# Patient Record
Sex: MALE | URBAN - METROPOLITAN AREA
[De-identification: names, ages, dates, MRNs, and addresses within clinical notes are randomized per-mention and may not be internally consistent; named-entity substitution may affect disease eponyms.]

---

## 2017-11-28 ENCOUNTER — INPATIENT (INPATIENT)
Facility: HOSPITAL | Age: 62
LOS: 2 days | Discharge: HOME | DRG: 100 | End: 2017-12-01
Attending: INTERNAL MEDICINE | Admitting: INTERNAL MEDICINE
Payer: MEDICARE

## 2017-11-28 VITALS
HEART RATE: 112 BPM | SYSTOLIC BLOOD PRESSURE: 210 MMHG | OXYGEN SATURATION: 98 % | DIASTOLIC BLOOD PRESSURE: 108 MMHG | HEIGHT: 73 IN | TEMPERATURE: 98 F | WEIGHT: 164.91 LBS | RESPIRATION RATE: 20 BRPM

## 2017-11-28 DIAGNOSIS — C71.9 MALIGNANT NEOPLASM OF BRAIN, UNSPECIFIED: ICD-10-CM

## 2017-11-28 DIAGNOSIS — Z98.890 OTHER SPECIFIED POSTPROCEDURAL STATES: Chronic | ICD-10-CM

## 2017-11-28 DIAGNOSIS — G40.901 EPILEPSY, UNSPECIFIED, NOT INTRACTABLE, WITH STATUS EPILEPTICUS: ICD-10-CM

## 2017-11-28 DIAGNOSIS — G93.41 METABOLIC ENCEPHALOPATHY: ICD-10-CM

## 2017-11-28 DIAGNOSIS — N17.9 ACUTE KIDNEY FAILURE, UNSPECIFIED: ICD-10-CM

## 2017-11-28 DIAGNOSIS — Z98.890 OTHER SPECIFIED POSTPROCEDURAL STATES: ICD-10-CM

## 2017-11-28 DIAGNOSIS — Z90.49 ACQUIRED ABSENCE OF OTHER SPECIFIED PARTS OF DIGESTIVE TRACT: Chronic | ICD-10-CM

## 2017-11-28 DIAGNOSIS — M23.309 OTHER MENISCUS DERANGEMENTS, UNSPECIFIED MENISCUS, UNSPECIFIED KNEE: Chronic | ICD-10-CM

## 2017-11-28 DIAGNOSIS — I16.9 HYPERTENSIVE CRISIS, UNSPECIFIED: ICD-10-CM

## 2017-11-28 LAB
ALBUMIN SERPL ELPH-MCNC: 4.1 G/DL — SIGNIFICANT CHANGE UP (ref 3.3–5.2)
ALP SERPL-CCNC: 83 U/L — SIGNIFICANT CHANGE UP (ref 40–120)
ALT FLD-CCNC: 13 U/L — SIGNIFICANT CHANGE UP
ANION GAP SERPL CALC-SCNC: 24 MMOL/L — HIGH (ref 5–17)
ANISOCYTOSIS BLD QL: SLIGHT — SIGNIFICANT CHANGE UP
APTT BLD: 44.3 SEC — HIGH (ref 27.5–37.4)
AST SERPL-CCNC: 23 U/L — SIGNIFICANT CHANGE UP
BASOPHILS NFR BLD AUTO: 1 % — SIGNIFICANT CHANGE UP (ref 0–2)
BILIRUB SERPL-MCNC: 0.3 MG/DL — LOW (ref 0.4–2)
BUN SERPL-MCNC: 24 MG/DL — HIGH (ref 8–20)
CALCIUM SERPL-MCNC: 9.3 MG/DL — SIGNIFICANT CHANGE UP (ref 8.6–10.2)
CHLORIDE SERPL-SCNC: 99 MMOL/L — SIGNIFICANT CHANGE UP (ref 98–107)
CO2 SERPL-SCNC: 14 MMOL/L — LOW (ref 22–29)
CREAT SERPL-MCNC: 1.56 MG/DL — HIGH (ref 0.5–1.3)
EOSINOPHIL NFR BLD AUTO: 1 % — SIGNIFICANT CHANGE UP (ref 0–6)
GLUCOSE SERPL-MCNC: 206 MG/DL — HIGH (ref 70–115)
HCT VFR BLD CALC: 38.9 % — LOW (ref 42–52)
HGB BLD-MCNC: 13.3 G/DL — LOW (ref 14–18)
HYPOCHROMIA BLD QL: SLIGHT — SIGNIFICANT CHANGE UP
INR BLD: 0.97 RATIO — SIGNIFICANT CHANGE UP (ref 0.88–1.16)
LYMPHOCYTES # BLD AUTO: 39 % — SIGNIFICANT CHANGE UP (ref 20–55)
MACROCYTES BLD QL: SLIGHT — SIGNIFICANT CHANGE UP
MAGNESIUM SERPL-MCNC: 2 MG/DL — SIGNIFICANT CHANGE UP (ref 1.6–2.6)
MCHC RBC-ENTMCNC: 29.6 PG — SIGNIFICANT CHANGE UP (ref 27–31)
MCHC RBC-ENTMCNC: 34.2 G/DL — SIGNIFICANT CHANGE UP (ref 32–36)
MCV RBC AUTO: 86.4 FL — SIGNIFICANT CHANGE UP (ref 80–94)
MICROCYTES BLD QL: SLIGHT — SIGNIFICANT CHANGE UP
MONOCYTES NFR BLD AUTO: 12 % — HIGH (ref 3–10)
NEUTROPHILS NFR BLD AUTO: 45 % — SIGNIFICANT CHANGE UP (ref 37–73)
OVALOCYTES BLD QL SMEAR: SLIGHT — SIGNIFICANT CHANGE UP
PLAT MORPH BLD: NORMAL — SIGNIFICANT CHANGE UP
PLATELET # BLD AUTO: 210 K/UL — SIGNIFICANT CHANGE UP (ref 150–400)
POIKILOCYTOSIS BLD QL AUTO: SLIGHT — SIGNIFICANT CHANGE UP
POTASSIUM SERPL-MCNC: 3.7 MMOL/L — SIGNIFICANT CHANGE UP (ref 3.5–5.3)
POTASSIUM SERPL-SCNC: 3.7 MMOL/L — SIGNIFICANT CHANGE UP (ref 3.5–5.3)
PROT SERPL-MCNC: 7.8 G/DL — SIGNIFICANT CHANGE UP (ref 6.6–8.7)
PROTHROM AB SERPL-ACNC: 10.7 SEC — SIGNIFICANT CHANGE UP (ref 9.8–12.7)
RBC # BLD: 4.5 M/UL — LOW (ref 4.6–6.2)
RBC # FLD: 12.9 % — SIGNIFICANT CHANGE UP (ref 11–15.6)
RBC BLD AUTO: ABNORMAL
SODIUM SERPL-SCNC: 137 MMOL/L — SIGNIFICANT CHANGE UP (ref 135–145)
TROPONIN T SERPL-MCNC: <0.01 NG/ML — SIGNIFICANT CHANGE UP (ref 0–0.06)
VARIANT LYMPHS # BLD: 2 % — SIGNIFICANT CHANGE UP (ref 0–6)
WBC # BLD: 11.8 K/UL — HIGH (ref 4.8–10.8)
WBC # FLD AUTO: 11.8 K/UL — HIGH (ref 4.8–10.8)

## 2017-11-28 PROCEDURE — 93010 ELECTROCARDIOGRAM REPORT: CPT

## 2017-11-28 PROCEDURE — 71010: CPT | Mod: 26

## 2017-11-28 PROCEDURE — 31500 INSERT EMERGENCY AIRWAY: CPT

## 2017-11-28 PROCEDURE — 70450 CT HEAD/BRAIN W/O DYE: CPT | Mod: 26

## 2017-11-28 PROCEDURE — 99291 CRITICAL CARE FIRST HOUR: CPT

## 2017-11-28 PROCEDURE — 95819 EEG AWAKE AND ASLEEP: CPT | Mod: 26

## 2017-11-28 PROCEDURE — 99291 CRITICAL CARE FIRST HOUR: CPT | Mod: 25

## 2017-11-28 RX ORDER — ETOMIDATE 2 MG/ML
20 INJECTION INTRAVENOUS ONCE
Qty: 0 | Refills: 0 | Status: COMPLETED | OUTPATIENT
Start: 2017-11-28 | End: 2017-11-28

## 2017-11-28 RX ORDER — FENTANYL CITRATE 50 UG/ML
25 INJECTION INTRAVENOUS
Qty: 0 | Refills: 0 | Status: DISCONTINUED | OUTPATIENT
Start: 2017-11-28 | End: 2017-11-30

## 2017-11-28 RX ORDER — LEVETIRACETAM 250 MG/1
1000 TABLET, FILM COATED ORAL ONCE
Qty: 0 | Refills: 0 | Status: COMPLETED | OUTPATIENT
Start: 2017-11-28 | End: 2017-11-28

## 2017-11-28 RX ORDER — LEVETIRACETAM 250 MG/1
500 TABLET, FILM COATED ORAL EVERY 12 HOURS
Qty: 0 | Refills: 0 | Status: DISCONTINUED | OUTPATIENT
Start: 2017-11-28 | End: 2017-11-30

## 2017-11-28 RX ORDER — CHLORHEXIDINE GLUCONATE 213 G/1000ML
15 SOLUTION TOPICAL
Qty: 0 | Refills: 0 | Status: DISCONTINUED | OUTPATIENT
Start: 2017-11-28 | End: 2017-11-30

## 2017-11-28 RX ORDER — ETOMIDATE 2 MG/ML
10 INJECTION INTRAVENOUS ONCE
Qty: 0 | Refills: 0 | Status: COMPLETED | OUTPATIENT
Start: 2017-11-28 | End: 2017-11-28

## 2017-11-28 RX ORDER — FENTANYL CITRATE 50 UG/ML
50 INJECTION INTRAVENOUS ONCE
Qty: 0 | Refills: 0 | Status: DISCONTINUED | OUTPATIENT
Start: 2017-11-28 | End: 2017-11-28

## 2017-11-28 RX ORDER — PROPOFOL 10 MG/ML
5 INJECTION, EMULSION INTRAVENOUS
Qty: 500 | Refills: 0 | Status: DISCONTINUED | OUTPATIENT
Start: 2017-11-28 | End: 2017-11-28

## 2017-11-28 RX ORDER — NICARDIPINE HYDROCHLORIDE 30 MG/1
5 CAPSULE, EXTENDED RELEASE ORAL
Qty: 40 | Refills: 0 | Status: DISCONTINUED | OUTPATIENT
Start: 2017-11-28 | End: 2017-11-28

## 2017-11-28 RX ORDER — HALOPERIDOL DECANOATE 100 MG/ML
5 INJECTION INTRAMUSCULAR ONCE
Qty: 0 | Refills: 0 | Status: COMPLETED | OUTPATIENT
Start: 2017-11-28 | End: 2017-11-28

## 2017-11-28 RX ORDER — PROPOFOL 10 MG/ML
5 INJECTION, EMULSION INTRAVENOUS
Qty: 1000 | Refills: 0 | Status: DISCONTINUED | OUTPATIENT
Start: 2017-11-28 | End: 2017-11-29

## 2017-11-28 RX ORDER — PROPOFOL 10 MG/ML
40 INJECTION, EMULSION INTRAVENOUS ONCE
Qty: 0 | Refills: 0 | Status: COMPLETED | OUTPATIENT
Start: 2017-11-28 | End: 2017-11-28

## 2017-11-28 RX ORDER — NICARDIPINE HYDROCHLORIDE 30 MG/1
5 CAPSULE, EXTENDED RELEASE ORAL
Qty: 40 | Refills: 0 | Status: DISCONTINUED | OUTPATIENT
Start: 2017-11-28 | End: 2017-11-29

## 2017-11-28 RX ORDER — PANTOPRAZOLE SODIUM 20 MG/1
40 TABLET, DELAYED RELEASE ORAL DAILY
Qty: 0 | Refills: 0 | Status: DISCONTINUED | OUTPATIENT
Start: 2017-11-28 | End: 2017-11-30

## 2017-11-28 RX ORDER — DEXMEDETOMIDINE HYDROCHLORIDE IN 0.9% SODIUM CHLORIDE 4 UG/ML
0.5 INJECTION INTRAVENOUS
Qty: 200 | Refills: 0 | Status: DISCONTINUED | OUTPATIENT
Start: 2017-11-28 | End: 2017-11-29

## 2017-11-28 RX ADMIN — FENTANYL CITRATE 25 MICROGRAM(S): 50 INJECTION INTRAVENOUS at 16:37

## 2017-11-28 RX ADMIN — PROPOFOL 2.24 MICROGRAM(S)/KG/MIN: 10 INJECTION, EMULSION INTRAVENOUS at 07:21

## 2017-11-28 RX ADMIN — NICARDIPINE HYDROCHLORIDE 25 MG/HR: 30 CAPSULE, EXTENDED RELEASE ORAL at 10:14

## 2017-11-28 RX ADMIN — FENTANYL CITRATE 50 MICROGRAM(S): 50 INJECTION INTRAVENOUS at 18:55

## 2017-11-28 RX ADMIN — FENTANYL CITRATE 25 MICROGRAM(S): 50 INJECTION INTRAVENOUS at 01:00

## 2017-11-28 RX ADMIN — DEXMEDETOMIDINE HYDROCHLORIDE IN 0.9% SODIUM CHLORIDE 9.35 MICROGRAM(S)/KG/HR: 4 INJECTION INTRAVENOUS at 16:45

## 2017-11-28 RX ADMIN — HALOPERIDOL DECANOATE 5 MILLIGRAM(S): 100 INJECTION INTRAMUSCULAR at 06:58

## 2017-11-28 RX ADMIN — PROPOFOL 2.24 MICROGRAM(S)/KG/MIN: 10 INJECTION, EMULSION INTRAVENOUS at 10:15

## 2017-11-28 RX ADMIN — ETOMIDATE 20 MILLIGRAM(S): 2 INJECTION INTRAVENOUS at 07:09

## 2017-11-28 RX ADMIN — PROPOFOL 40 MILLIGRAM(S): 10 INJECTION, EMULSION INTRAVENOUS at 07:18

## 2017-11-28 RX ADMIN — PROPOFOL 40 MILLIGRAM(S): 10 INJECTION, EMULSION INTRAVENOUS at 07:12

## 2017-11-28 RX ADMIN — Medication 2 MILLIGRAM(S): at 06:40

## 2017-11-28 RX ADMIN — CHLORHEXIDINE GLUCONATE 15 MILLILITER(S): 213 SOLUTION TOPICAL at 17:38

## 2017-11-28 RX ADMIN — FENTANYL CITRATE 25 MICROGRAM(S): 50 INJECTION INTRAVENOUS at 09:45

## 2017-11-28 RX ADMIN — PANTOPRAZOLE SODIUM 40 MILLIGRAM(S): 20 TABLET, DELAYED RELEASE ORAL at 11:13

## 2017-11-28 RX ADMIN — DEXMEDETOMIDINE HYDROCHLORIDE IN 0.9% SODIUM CHLORIDE 9.35 MICROGRAM(S)/KG/HR: 4 INJECTION INTRAVENOUS at 23:37

## 2017-11-28 RX ADMIN — FENTANYL CITRATE 25 MICROGRAM(S): 50 INJECTION INTRAVENOUS at 16:07

## 2017-11-28 RX ADMIN — DEXMEDETOMIDINE HYDROCHLORIDE IN 0.9% SODIUM CHLORIDE 9.35 MICROGRAM(S)/KG/HR: 4 INJECTION INTRAVENOUS at 10:15

## 2017-11-28 RX ADMIN — FENTANYL CITRATE 50 MICROGRAM(S): 50 INJECTION INTRAVENOUS at 18:40

## 2017-11-28 RX ADMIN — DEXMEDETOMIDINE HYDROCHLORIDE IN 0.9% SODIUM CHLORIDE 9.35 MICROGRAM(S)/KG/HR: 4 INJECTION INTRAVENOUS at 12:00

## 2017-11-28 RX ADMIN — ETOMIDATE 10 MILLIGRAM(S): 2 INJECTION INTRAVENOUS at 07:09

## 2017-11-28 RX ADMIN — Medication 4 MILLIGRAM(S): at 05:45

## 2017-11-28 RX ADMIN — LEVETIRACETAM 400 MILLIGRAM(S): 250 TABLET, FILM COATED ORAL at 06:17

## 2017-11-28 RX ADMIN — Medication 2 MILLIGRAM(S): at 06:52

## 2017-11-28 RX ADMIN — ETOMIDATE 10 MILLIGRAM(S): 2 INJECTION INTRAVENOUS at 06:00

## 2017-11-28 RX ADMIN — LEVETIRACETAM 420 MILLIGRAM(S): 250 TABLET, FILM COATED ORAL at 17:38

## 2017-11-28 NOTE — ED ADULT NURSE REASSESSMENT NOTE - NS ED NURSE REASSESS COMMENT FT1
Pt is climbing out of bad and very confused and unable to follow commands. Wife at bedside. Dr. Blanchard aware and ativan administered. Pt still unable to stay still so an additional dose was given. Dr Blanchard @ bedside.

## 2017-11-28 NOTE — H&P ADULT - PMH
Astrocytoma brain tumor    CVA (cerebral vascular accident)    HTN (hypertension)    Libman-Sacks endocarditis    Lupus    TIA (transient ischemic attack)

## 2017-11-28 NOTE — H&P ADULT - HISTORY OF PRESENT ILLNESS
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  Pt has not seen a doctor or followed up in many years.  As per wife she heard pt scream and then found him on the floor in his room with what she describes as tonic/clonic seizure activity.  In ED pt had arrived post-ictal and had become very combative requiring multiple doses of Ativan and ultimately required intubation.  Pt currently intubated, s/p mult doses of Ativan and on Propofol gtt.  Unable to answer questions.  Pt's wife states he was in his usual state of health over last few days.  Normally high functioning but short term memory loss.

## 2017-11-28 NOTE — ED PROVIDER NOTE - CRITICAL CARE PROVIDED
additional history taking/consultation with other physicians/direct patient care (not related to procedure)/interpretation of diagnostic studies/documentation/consult w/ pt's family directly relating to pts condition

## 2017-11-28 NOTE — ED PROCEDURE NOTE - CPROC ED TRACHE INTUB DETAIL1
Patient was pre-oxygenated. An endotracheal tube (ETT) was placed through the vocal cords into the trachea. During intubation, staff applied gentle pressure to the cricoid cartilage. ETT position was confirmed by auscultation of bilateral breath sounds to all lung fields. ETCO2 level was appropriate./Patient connected to ventilator with settings as ordered./Difficult/crash intubation (see additional details section).

## 2017-11-28 NOTE — ED ADULT NURSE REASSESSMENT NOTE - NS ED NURSE REASSESS COMMENT FT1
assumed pt care this am while pt was just intubated, pt very combative. more iv' s started, diprivan infusion started. [place] placed per MD. Pt was also cleaned and changed due to large BM. Rectal temp also complete. await ICU bed.

## 2017-11-28 NOTE — H&P ADULT - MENTAL STATUS
sedated, intubated, received Ativan  Will have to re-evaluate neurologically once sedation wears off

## 2017-11-28 NOTE — CONSULT NOTE ADULT - SUBJECTIVE AND OBJECTIVE BOX
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  Pt has not seen a doctor or followed up in many years.  As per wife she heard pt scream and then found him on the floor in his room with what she describes as tonic/clonic seizure activity.  In ED pt had arrived post-ictal and had become very combative requiring multiple doses of Ativan and ultimately required intubation.  Pt currently intubated, s/p mult doses of Ativan and on Propofol gtt.  Unable to answer questions.  Pt's wife states he was in his usual state of health over last few days.  Normally high functioning but short term memory loss.    Patient seen this morning. He is intubated and agitated despite sedation as listed. No history of seizures.    PAST MEDICAL & SURGICAL HISTORY:  Libman-Sacks endocarditis  Lupus  CVA (cerebral vascular accident)  TIA (transient ischemic attack)  HTN (hypertension)  Astrocytoma brain tumor  Other meniscus derangements: repair  History of appendectomy  H/O brain surgery    MEDICATIONS  (STANDING):  chlorhexidine 0.12% Liquid 15 milliLiter(s) Swish and Spit two times a day  dexmedetomidine Infusion 0.5 MICROgram(s)/kG/Hr (9.35 mL/Hr) IV Continuous <Continuous>  levETIRAcetam  IVPB 500 milliGRAM(s) IV Intermittent every 12 hours  niCARdipine Infusion 5 mG/Hr (25 mL/Hr) IV Continuous <Continuous>  pantoprazole  Injectable 40 milliGRAM(s) IV Push daily  propofol Infusion 5 MICROgram(s)/kG/Min (2.244 mL/Hr) IV Continuous <Continuous>    MEDICATIONS  (PRN):  fentaNYL    Injectable 25 MICROGram(s) IV Push every 3 hours PRN Severe Pain (7 - 10)    Allergies    phenobarbital ([place]-Royal)    Intolerances        FAMILY HISTORY:  No pertinent family history in first degree relatives          SOCIAL HISTORY:    Tobacco:  no  Alcohol:  no  Drugs:  no        REVIEW OF SYSTEMS:    Relevant systems are negative except as noted in the chart, HPI, and PMH      VITAL SIGNS:  Vital Signs Last 24 Hrs  T(C): 37.4 (28 Nov 2017 10:15), Max: 37.4 (28 Nov 2017 09:50)  T(F): 99.3 (28 Nov 2017 10:15), Max: 99.3 (28 Nov 2017 09:50)  HR: 75 (28 Nov 2017 13:00) (73 - 124)  BP: 108/59 (28 Nov 2017 13:00) (108/59 - 218/121)  BP(mean): 78 (28 Nov 2017 13:00) (78 - 138)  RR: 16 (28 Nov 2017 13:00) (14 - 31)  SpO2: 100% (28 Nov 2017 13:00) (94% - 100%)    PHYSICAL EXAMINATION:    General: Well-developed, well nourished, He is intabated and agitated despite propofol and presidex  Cardiac:  Regular rate and rhythm. No carotid bruits appreciated.  Eyes: Fundoscopic examination was deferred.  Neurologic:  - Mental Status: as stated. Follows no commands  Cranial Nerves II-XII:    II:   ; Visual fields- blinks to threat; Pupils are equal, round, and reactive to light.  - Motor:  Strength- formal testing not possible. Moves all 4 limbs with no weakness    - Reflexes:  2+ and symmetric at the knees.  Plantar responses flexor.  l      LABS:                          13.3   11.8  )-----------( 210      ( 28 Nov 2017 06:03 )             38.9     28 Nov 2017 06:03    137    |  99     |  24.0   ----------------------------<  206    3.7     |  14.0   |  1.56     Ca    9.3        28 Nov 2017 06:03  Mg     2.0       28 Nov 2017 06:03    TPro  7.8    /  Alb  4.1    /  TBili  0.3    /  DBili  x      /  AST  23     /  ALT  13     /  AlkPhos  83     28 Nov 2017 06:03    LIVER FUNCTIONS - ( 28 Nov 2017 06:03 )  Alb: 4.1 g/dL / Pro: 7.8 g/dL / ALK PHOS: 83 U/L / ALT: 13 U/L / AST: 23 U/L / GGT: x           PT/INR - ( 28 Nov 2017 06:03 )   PT: 10.7 sec;   INR: 0.97 ratio         PTT - ( 28 Nov 2017 06:03 )  PTT:44.3 sec      RADIOLOGY & ADDITIONAL STUDIES:      < from: CT Head No Cont (11.28.17 @ 06:39) >  IMPRESSION: No intracranial hemorrhage or evidence for acute territorial   infarct.     Age indeterminant left thalamic infarct. Status post right pterional   craniotomy with subjacent encephalomalacia/gliosis. Asymmetric left   frontal white matter lucency may represent additional focus of gliosis.     < end of copied text >    IMPRESSION:  62 year old man with h/o astrocytoma resection more than 15 years ago presents with seizure.  No h/o seizures in past. Currently  he is intubated and agitated but otherwise his exam is nonfocal.  He apparently has not seen a neurologist or neurosurgeon in many years.    PLAN:  1. Critical care management - as discussed  2.  Continue keppra.  3.  cEEG monitoring  4. MRI brain with kayleen once stable.  5.

## 2017-11-28 NOTE — ED ADULT NURSE NOTE - OBJECTIVE STATEMENT
Assumed pt care @ 0545. Pt received sitting on stretcher in NAD. Pt AOx0 with wife @ bedside. Pt heard a yell and then a loud thump at around 0440.  Pt found on the floor seizing. Pt not speaking but combative and confused. Pt unable to sit still. Pt given etomidate to get CT scan of brain. Pt has a h/o brain tumor partially removed in 2001 and a embolic stroke in 2012. NSR on cardiac monitor. Lungs CTA, RR even unlabored.  Skin warm, dry, color appropriate for age and race.

## 2017-11-28 NOTE — H&P ADULT - ATTENDING COMMENTS
I saw this patient multiple times throughout the late morning and early afternoon and agree with the above.  EEG is currently being obtained -- the sister at bedside indicates that he is not redirectable, and has poor short term memory, so our ability to obtain good EEG waveforms will be predicated in part on managing his airway in the short run.  Will attempt to eliminate propofol infusion and escalate dexmedetomidine infusion as needed for agitation at this point -- anticipate ability to extubate tomorrow based on titration requirements of antiepileptics -- if no seizure activity, would aim for extubation early.    Updated sister at bedside.

## 2017-11-28 NOTE — ED ADULT NURSE REASSESSMENT NOTE - NS ED NURSE REASSESS COMMENT FT1
Pt extremely agitated and climbing out of meds despite chemically restraining pt. Dr. Blanchard to intubate pt for safety. Wife @ bedside and okay with the plan of care.  0709 20 of etomidate given. 40 of Propofol pushed @ 0712 with an additional 10 of etomidate @0715 and another 40 of propofol @ 0718. Pt tubed @ 0718 7.5 tube 23 @ the lip. 16F Og tube placed @ 0720. Propofol drip started @ 0721.

## 2017-11-28 NOTE — ED PROVIDER NOTE - MEDICAL DECISION MAKING DETAILS
Plan to do Head CT to r/o bleeding, labs including electrolytes and magnesium levels, will give Keppra, admit to medicine and consult [id number] fo revaluation.

## 2017-11-28 NOTE — H&P ADULT - PROBLEM SELECTOR PLAN 1
Not on AED since post surgery in 2001  Loaded with Keppra in ER  Continue Keppra  Neurology/[facility] group callled  STAT EEG  Admit to ICU

## 2017-11-28 NOTE — ED PROVIDER NOTE - PROGRESS NOTE DETAILS
pt still not remaining still, trying to get out of bed despite total of ativan 6mg and haldol 5mg.   decision made to intubate.  will admit to micu

## 2017-11-28 NOTE — ED PROVIDER NOTE - OBJECTIVE STATEMENT
61 y/o male with PMHx of hippocampal tumor s/p resection presents to the ED with c/o witnessed seizure, onset this morning. Pt wife reported generalized tonic clonic seizure that lasted a few seconds to a minute max. Pt was found by wife. Pt postictal, pt appeared to be in respiratory distress per EMS, pt was turned on side and found to be breathing normally. In the ED, pt was post-ictal and became combative, pt was able to move around in bed. Pt was given a total 4 mg of Ativan before being able to be control. Pt has had seizures in the past, not currently on medication, Shashank-Royal's reaction to Phenobarbital. Pt was in normal states of cabrera until this morning.

## 2017-11-28 NOTE — H&P ADULT - ASSESSMENT
62 YOM with status epilepticus, intubated due to failed airway protection/respiratory failure, AMS/encephalopathy, r/o tumor recurrance, r/o stroke, DEMARCUS

## 2017-11-28 NOTE — ED ADULT TRIAGE NOTE - CHIEF COMPLAINT QUOTE
Pt's wife states "I heard him scream and heard a thump and saw him face down on the floor seizing", pt postictal, no obvious signs of trauma

## 2017-11-28 NOTE — ED PROVIDER NOTE - PMH
Astrocytoma brain tumor    CVA (cerebral vascular accident)    HTN (hypertension)    Lupus    TIA (transient ischemic attack)

## 2017-11-28 NOTE — EEG REPORT - NS EEG TEXT BOX
[facility]  Report of Portable EEG with Video    [facility]: [address], [place] [address], Phone: [phone]  [facility]: [address] [place] [address], [place] [address], Phone: [phone]  Office: [address] [facility], [place] [address], [place] [address], Phone: [phone]    Patient Name: Bret Castro    Age: 62 y  : 1955  Patient ID: -, MRN #: -, Location: -  Referring Physician: Corinne M. Walsh    EEG #: [id number]  Study Date: 2017		    Technical Information:					  On Instrument: -  Placement and Labeling of Electrodes:  The EEG was performed utilizing 20 channels referential EEG connections (coronal over temporal over parasagittal montage) using all standard 10-20 electrode placements with EKG.  Recording was at a sampling rate of 256 samples per second per channel.  Time synchronized digital video recording was done simultaneously with EEG recording.  A low light infrared camera was used for low light recording.  Gilberto and seizure detection algorithms were utilized.    History:  Crani/resection right temporal astrocytoma in .  Brought in seizing-given 8 mg. Ativan and propofol to break.    -    Medication	  <Medication>Precedex	  <Medication>Keppra	  <Medication>Propofol	    Study Interpretation:    FINDINGS:  The background consists of diffuse alpha and theta range activity with abundant 15-25 Hz beta seen over the right fronto-temporal region. In addition to higher frequency activity there is also higher amplitude EEG over the right fronto-cental and temporal regions. Periods of generalized background slowing lasting several seconds occur in the latter part of the recording.    Background Slowing:  Moderate to marked generalized slowing..    Sleep Background:  No sleep architecture.    Epileptiform Activity:   Occasional right fronto-cental spike discharges are seen embedded in the burst activity.    Events:  No clinical events were recorded.  No seizures were recorded.    Activation Procedures  -Photic stimulation was performed and did not elicit any abnormalities.      EEG Impression:    This is an abnormal portable routine EEG due to the presence of:  1. Moderate to marked generalized slowing with periods of background suppression  2. Right hemisphere breach artifact  3. Right fronto-central epileptiform spike discharges  4. No clinical or electrographic seizures    Clinical Correlation:    The study is consistent with right hemisphere post-operative changes, generalized anesthetic effects and right frontal interictal epileptiform spike focus.        	  Blaine Mckinnon MD  Director, Neurology, [facility]  , [facility]

## 2017-11-29 DIAGNOSIS — F06.8 OTHER SPECIFIED MENTAL DISORDERS DUE TO KNOWN PHYSIOLOGICAL CONDITION: ICD-10-CM

## 2017-11-29 DIAGNOSIS — I10 ESSENTIAL (PRIMARY) HYPERTENSION: ICD-10-CM

## 2017-11-29 DIAGNOSIS — J96.00 ACUTE RESPIRATORY FAILURE, UNSPECIFIED WHETHER WITH HYPOXIA OR HYPERCAPNIA: ICD-10-CM

## 2017-11-29 LAB
ANION GAP SERPL CALC-SCNC: 13 MMOL/L — SIGNIFICANT CHANGE UP (ref 5–17)
BUN SERPL-MCNC: 23 MG/DL — HIGH (ref 8–20)
CALCIUM SERPL-MCNC: 9.1 MG/DL — SIGNIFICANT CHANGE UP (ref 8.6–10.2)
CHLORIDE SERPL-SCNC: 103 MMOL/L — SIGNIFICANT CHANGE UP (ref 98–107)
CO2 SERPL-SCNC: 23 MMOL/L — SIGNIFICANT CHANGE UP (ref 22–29)
CREAT SERPL-MCNC: 1.43 MG/DL — HIGH (ref 0.5–1.3)
GLUCOSE SERPL-MCNC: 132 MG/DL — HIGH (ref 70–115)
HCT VFR BLD CALC: 34.4 % — LOW (ref 42–52)
HGB BLD-MCNC: 11.7 G/DL — LOW (ref 14–18)
MAGNESIUM SERPL-MCNC: 2 MG/DL — SIGNIFICANT CHANGE UP (ref 1.6–2.6)
MCHC RBC-ENTMCNC: 28.7 PG — SIGNIFICANT CHANGE UP (ref 27–31)
MCHC RBC-ENTMCNC: 34 G/DL — SIGNIFICANT CHANGE UP (ref 32–36)
MCV RBC AUTO: 84.3 FL — SIGNIFICANT CHANGE UP (ref 80–94)
PHOSPHATE SERPL-MCNC: 2.7 MG/DL — SIGNIFICANT CHANGE UP (ref 2.4–4.7)
PLATELET # BLD AUTO: 154 K/UL — SIGNIFICANT CHANGE UP (ref 150–400)
POTASSIUM SERPL-MCNC: 3.9 MMOL/L — SIGNIFICANT CHANGE UP (ref 3.5–5.3)
POTASSIUM SERPL-SCNC: 3.9 MMOL/L — SIGNIFICANT CHANGE UP (ref 3.5–5.3)
RBC # BLD: 4.08 M/UL — LOW (ref 4.6–6.2)
RBC # FLD: 12.7 % — SIGNIFICANT CHANGE UP (ref 11–15.6)
SODIUM SERPL-SCNC: 139 MMOL/L — SIGNIFICANT CHANGE UP (ref 135–145)
WBC # BLD: 10.5 K/UL — SIGNIFICANT CHANGE UP (ref 4.8–10.8)
WBC # FLD AUTO: 10.5 K/UL — SIGNIFICANT CHANGE UP (ref 4.8–10.8)

## 2017-11-29 PROCEDURE — 99291 CRITICAL CARE FIRST HOUR: CPT

## 2017-11-29 PROCEDURE — 95951: CPT | Mod: 26

## 2017-11-29 PROCEDURE — 70552 MRI BRAIN STEM W/DYE: CPT | Mod: 26

## 2017-11-29 RX ORDER — FENTANYL CITRATE 50 UG/ML
50 INJECTION INTRAVENOUS ONCE
Qty: 0 | Refills: 0 | Status: DISCONTINUED | OUTPATIENT
Start: 2017-11-29 | End: 2017-11-30

## 2017-11-29 RX ORDER — SODIUM CHLORIDE 9 MG/ML
1000 INJECTION, SOLUTION INTRAVENOUS
Qty: 0 | Refills: 0 | Status: DISCONTINUED | OUTPATIENT
Start: 2017-11-29 | End: 2017-11-29

## 2017-11-29 RX ORDER — DEXMEDETOMIDINE HYDROCHLORIDE IN 0.9% SODIUM CHLORIDE 4 UG/ML
1.5 INJECTION INTRAVENOUS
Qty: 200 | Refills: 0 | Status: DISCONTINUED | OUTPATIENT
Start: 2017-11-29 | End: 2017-11-29

## 2017-11-29 RX ORDER — ENOXAPARIN SODIUM 100 MG/ML
40 INJECTION SUBCUTANEOUS DAILY
Qty: 0 | Refills: 0 | Status: DISCONTINUED | OUTPATIENT
Start: 2017-11-29 | End: 2017-12-01

## 2017-11-29 RX ADMIN — FENTANYL CITRATE 25 MICROGRAM(S): 50 INJECTION INTRAVENOUS at 03:52

## 2017-11-29 RX ADMIN — Medication 2 MILLIGRAM(S): at 14:09

## 2017-11-29 RX ADMIN — PANTOPRAZOLE SODIUM 40 MILLIGRAM(S): 20 TABLET, DELAYED RELEASE ORAL at 11:04

## 2017-11-29 RX ADMIN — LEVETIRACETAM 420 MILLIGRAM(S): 250 TABLET, FILM COATED ORAL at 05:04

## 2017-11-29 RX ADMIN — PROPOFOL 2.24 MICROGRAM(S)/KG/MIN: 10 INJECTION, EMULSION INTRAVENOUS at 04:52

## 2017-11-29 RX ADMIN — CHLORHEXIDINE GLUCONATE 15 MILLILITER(S): 213 SOLUTION TOPICAL at 17:25

## 2017-11-29 RX ADMIN — SODIUM CHLORIDE 100 MILLILITER(S): 9 INJECTION, SOLUTION INTRAVENOUS at 04:49

## 2017-11-29 RX ADMIN — LEVETIRACETAM 420 MILLIGRAM(S): 250 TABLET, FILM COATED ORAL at 17:25

## 2017-11-29 RX ADMIN — DEXMEDETOMIDINE HYDROCHLORIDE IN 0.9% SODIUM CHLORIDE 9.35 MICROGRAM(S)/KG/HR: 4 INJECTION INTRAVENOUS at 03:40

## 2017-11-29 RX ADMIN — ENOXAPARIN SODIUM 40 MILLIGRAM(S): 100 INJECTION SUBCUTANEOUS at 11:05

## 2017-11-29 RX ADMIN — FENTANYL CITRATE 25 MICROGRAM(S): 50 INJECTION INTRAVENOUS at 03:37

## 2017-11-29 RX ADMIN — CHLORHEXIDINE GLUCONATE 15 MILLILITER(S): 213 SOLUTION TOPICAL at 05:04

## 2017-11-29 RX ADMIN — Medication 2 MILLIGRAM(S): at 06:43

## 2017-11-29 NOTE — EEG REPORT - NS EEG TEXT BOX
[facility]  Report of Continuous Video EEG    [facility]: [address], [place] [address], Ph#: [phone]  [facility]: [address], [place] [address], Ph#: [phone]  Office: [address] [facility], [facility] [address], [place] [address] Ph#: [phone]    Patient Name: Bret Castro    Age: 62 y, : 1955  Patient ID: -, MRN #: -, Francis: -  Referring Physician: Corinne M. Walsh  EEG #: [id number]    Study Date: 2017    Study Information:    EEG Recording Technique:  The patient underwent continuous Video-EEG monitoring, using Telemetry System hardware on the XLTek Digital System. EEG and video data were stored on a computer hard drive with important events saved in digital archive files. The material was reviewed by a physician (electroencephalographer / epileptologist) on a daily basis. Gilberto and seizure detection algorithms were utilized and reviewed. An EEG Technician attended to the patient, and was available throughout daytime work hours.  The epilepsy center neurologist was available in person or on call 24-hours per day.  EEG Placement and Labeling of Electrodes:  The EEG was performed utilizing 20 channel referential EEG connections (coronal over temporal over parasagittal montage) using all standard 10-20 electrode placements with EKG, with additional electrodes placed in the inferior temporal region using the modified 10-10 montage electrode placements for elective admissions, or if deemed necessary. Recording was at a sampling rate of 256 samples per second per channel. Time synchronized digital video recording was done simultaneously with EEG recording. A low light infrared camera was used for low light recording.   History:  Crani/resection right temporal astrocytoma in .  Brought in seizing-given 8 mg. Ativan and propofol to break.    -    Medication	  <Medication>Precedex	  <Medication>Keppra	  <Medication>Propofol	    Study Interpretation:    FINDINGS:  At the start of the recordingthe  background consisted of diffuse alpha and theta range activity with abundant 15-25 Hz beta seen over the right fronto-temporal region. In addition to higher frequency activity there is also higher amplitude EEG over the right fronto-cental and temporal regions. Periods of generalized background slowing lasting several seconds occur in the latter part of the recording.    As propofol was tapered  background became continuous without  periods of suppression.  By the early AM of  patient was arouseable briefly with diffuse theta and some admixed alpha range activity during periods of responsiveness.    Background Slowing:  Moderate to marked generalized slowing improving to mild to moderate generalized slowing.    Sleep Background:  Vertex sharp waves seen asymmetrically, greater on right by end of study.    Epileptiform Activity:   Occasional low amplitude right fronto-cental sharp and spike discharges are seen..    Events:  No clinical events were recorded.  No seizures were recorded.    Activation Procedures  -Photic stimulation was performed and did not elicit any abnormalities.      EEG Impression:    This is an abnormal video EEG due to the presence of:  1. Moderate to marked generalized slowing with periods of background suppression improving to mild to moderate generalized slowing by the AM of .  2. Right hemisphere breach artifact and right  frontal slowing  3. Right fronto-central epileptiform sharp and rare spike discharges  4. No clinical or electrographic seizures    Clinical Correlation:    The study is consistent with right hemisphere post-operative changes, improving  encephalopathy and right frontal interictal epileptiform focus with risk for partial seizures.        	  Blaine Mckinnon MD  Director, Neurology, [facility]  , [facility]

## 2017-11-29 NOTE — PROGRESS NOTE ADULT - SUBJECTIVE AND OBJECTIVE BOX
Patient is a 62y old  Male who presents with a chief complaint of Seizures (28 Nov 2017 09:10)      BRIEF HOSPITAL COURSE: ***    Events last 24 hours: ***    PAST MEDICAL & SURGICAL HISTORY:  Libman-Sacks endocarditis  Lupus  CVA (cerebral vascular accident)  TIA (transient ischemic attack)  HTN (hypertension)  Astrocytoma brain tumor  Other meniscus derangements: repair  History of appendectomy  H/O brain surgery      Review of Systems:  CONSTITUTIONAL: No fever, chills, or fatigue  EYES: No eye pain, visual disturbances, or discharge  ENMT:  No difficulty hearing, tinnitus, vertigo; No sinus or throat pain  NECK: No pain or stiffness  RESPIRATORY: No cough, wheezing, chills or hemoptysis; No shortness of breath  CARDIOVASCULAR: No chest pain, palpitations, dizziness, or leg swelling  GASTROINTESTINAL: No abdominal or epigastric pain. No nausea, vomiting, or hematemesis; No diarrhea or constipation. No melena or hematochezia.  GENITOURINARY: No dysuria, frequency, hematuria, or incontinence  NEUROLOGICAL: No headaches, memory loss, loss of strength, numbness, or tremors  SKIN: No itching, burning, rashes, or lesions   MUSCULOSKELETAL: No joint pain or swelling; No muscle, back, or extremity pain  PSYCHIATRIC: No depression, anxiety, mood swings, or difficulty sleeping      Medications:        dexmedetomidine Infusion 0.5 MICROgram(s)/kG/Hr IV Continuous <Continuous>  fentaNYL    Injectable 25 MICROGram(s) IV Push every 3 hours PRN  fentaNYL    Injectable 50 MICROGram(s) IV Push once  levETIRAcetam  IVPB 500 milliGRAM(s) IV Intermittent every 12 hours  propofol Infusion 5 MICROgram(s)/kG/Min IV Continuous <Continuous>        pantoprazole  Injectable 40 milliGRAM(s) IV Push daily        multiple electrolytes Injection Type 1 1000 milliLiter(s) IV Continuous <Continuous>      chlorhexidine 0.12% Liquid 15 milliLiter(s) Swish and Spit two times a day        Mode: CPAP with PS  FiO2: 30  PEEP: 5  PS: 10  MAP: 13.4      ICU Vital Signs Last 24 Hrs  T(C): 36.8 (29 Nov 2017 04:00), Max: 37.4 (28 Nov 2017 09:50)  T(F): 98.2 (29 Nov 2017 04:00), Max: 99.3 (28 Nov 2017 09:50)  HR: 59 (29 Nov 2017 05:00) (54 - 124)  BP: 119/65 (29 Nov 2017 05:00) (101/58 - 218/121)  BP(mean): 87 (29 Nov 2017 05:00) (74 - 138)  ABP: --  ABP(mean): --  RR: 17 (29 Nov 2017 05:00) (14 - 31)  SpO2: 100% (29 Nov 2017 05:00) (94% - 100%)          I&O's Detail    28 Nov 2017 07:01  -  29 Nov 2017 05:21  --------------------------------------------------------  IN:    dexmedetomidine Infusion: 293.2 mL    multiple electrolytes Injection Type 1: 100 mL    niCARdipine Infusion: 50 mL    propofol Infusion: 28.8 mL    propofol Infusion: 14.2 mL  Total IN: 486.2 mL    OUT:    Indwelling Catheter - Urethral: 1475 mL    Nasoenteral Tube: 150 mL  Total OUT: 1625 mL    Total NET: -1138.8 mL            LABS:                        13.3   11.8  )-----------( 210      ( 28 Nov 2017 06:03 )             38.9     11-28    137  |  99  |  24.0<H>  ----------------------------<  206<H>  3.7   |  14.0<L>  |  1.56<H>    Ca    9.3      28 Nov 2017 06:03  Mg     2.0     11-28    TPro  7.8  /  Alb  4.1  /  TBili  0.3<L>  /  DBili  x   /  AST  23  /  ALT  13  /  AlkPhos  83  11-28      CARDIAC MARKERS ( 28 Nov 2017 06:03 )  x     / <0.01 ng/mL / x     / x     / x          CAPILLARY BLOOD GLUCOSE        PT/INR - ( 28 Nov 2017 06:03 )   PT: 10.7 sec;   INR: 0.97 ratio         PTT - ( 28 Nov 2017 06:03 )  PTT:44.3 sec    CULTURES:      Physical Examination:    General: No acute distress.  Alert, oriented, interactive, nonfocal    HEENT: Pupils equal, reactive to light.  Symmetric.    PULM: Clear to auscultation bilaterally, no significant sputum production    CVS: Regular rate and rhythm, no murmurs, rubs, or gallops    ABD: Soft, nondistended, nontender, normoactive bowel sounds, no masses    EXT: No edema, nontender    SKIN: Warm and well perfused, no rashes noted.    NEURO: A&Ox3, strength 5/5 all extremities, cranial nerves grossly intact, no focal deficits    RADIOLOGY: ***    CRITICAL CARE TIME SPENT: ***  Evaluating/treating patient, reviewing data/labs/imaging, discussing case with multidisciplinary team, discussing plan/goals of care with patient/family. Non-inclusive of procedure time. 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  Pt has not seen a doctor or followed up in many years.  As per wife she heard pt scream and then found him on the floor in his room with what she describes as tonic/clonic seizure activity.  In ED pt had arrived post-ictal and had become very combative requiring multiple doses of Ativan and ultimately required intubation. Pt's wife states he was in his usual state of health over last few days.  Normally high functioning but short term memory loss.    Patient remains intubated on full vent support. Undergoing 24 hour EEG monitoring- no seizure activity noted thus far. Received patient on Precedex gtt, but has been waking up overnight violently (a few EEG leads became detached), requiring additional sedative agents. Urine output decreasing throughout night.    PAST MEDICAL & SURGICAL HISTORY:  Libman-Sacks endocarditis  Lupus  CVA (cerebral vascular accident)  TIA (transient ischemic attack)  HTN (hypertension)  Astrocytoma brain tumor  Other meniscus derangements: repair  History of appendectomy  H/O brain surgery      Review of Systems: Unable to obtain secondary to mental status.      Medications:        dexmedetomidine Infusion 0.5 MICROgram(s)/kG/Hr IV Continuous <Continuous>  fentaNYL    Injectable 25 MICROGram(s) IV Push every 3 hours PRN  fentaNYL    Injectable 50 MICROGram(s) IV Push once  levETIRAcetam  IVPB 500 milliGRAM(s) IV Intermittent every 12 hours  propofol Infusion 5 MICROgram(s)/kG/Min IV Continuous <Continuous>  pantoprazole  Injectable 40 milliGRAM(s) IV Push daily  multiple electrolytes Injection Type 1 1000 milliLiter(s) IV Continuous <Continuous>  chlorhexidine 0.12% Liquid 15 milliLiter(s) Swish and Spit two times a day        Mode: CPAP with PS  FiO2: 30  PEEP: 5  PS: 10  MAP: 13.4      ICU Vital Signs Last 24 Hrs  T(C): 36.8 (29 Nov 2017 04:00), Max: 37.4 (28 Nov 2017 09:50)  T(F): 98.2 (29 Nov 2017 04:00), Max: 99.3 (28 Nov 2017 09:50)  HR: 59 (29 Nov 2017 05:00) (54 - 124)  BP: 119/65 (29 Nov 2017 05:00) (101/58 - 218/121)  BP(mean): 87 (29 Nov 2017 05:00) (74 - 138)  ABP: --  ABP(mean): --  RR: 17 (29 Nov 2017 05:00) (14 - 31)  SpO2: 100% (29 Nov 2017 05:00) (94% - 100%)          I&O's Detail    28 Nov 2017 07:01  -  29 Nov 2017 05:21  --------------------------------------------------------  IN:    dexmedetomidine Infusion: 293.2 mL    multiple electrolytes Injection Type 1: 100 mL    niCARdipine Infusion: 50 mL    propofol Infusion: 28.8 mL    propofol Infusion: 14.2 mL  Total IN: 486.2 mL    OUT:    Indwelling Catheter - Urethral: 1475 mL    Nasoenteral Tube: 150 mL  Total OUT: 1625 mL    Total NET: -1138.8 mL            LABS:                        13.3   11.8  )-----------( 210      ( 28 Nov 2017 06:03 )             38.9     11-28    137  |  99  |  24.0<H>  ----------------------------<  206<H>  3.7   |  14.0<L>  |  1.56<H>    Ca    9.3      28 Nov 2017 06:03  Mg     2.0     11-28    TPro  7.8  /  Alb  4.1  /  TBili  0.3<L>  /  DBili  x   /  AST  23  /  ALT  13  /  AlkPhos  83  11-28      CARDIAC MARKERS ( 28 Nov 2017 06:03 )  x     / <0.01 ng/mL / x     / x     / x          CAPILLARY BLOOD GLUCOSE        PT/INR - ( 28 Nov 2017 06:03 )   PT: 10.7 sec;   INR: 0.97 ratio         PTT - ( 28 Nov 2017 06:03 )  PTT:44.3 sec    CULTURES:      Physical Examination:    General: sedated, intubated, undergoing EEG    HEENT: Pupils equal, reactive to light, pinpoint.  Symmetric.    PULM: Clear to auscultation bilaterally, no significant sputum production    CVS: bradycardia, regular rhythm, no murmurs, rubs, or gallops    ABD: Soft, nondistended, nontender, normoactive bowel sounds, no masses    EXT: No edema, nontender    SKIN: Warm and well perfused, no rashes noted.    NEURO: heavily sedated now    RADIOLOGY:     < from: Xray Chest 1 View AP/PA. (11.28.17 @ 07:39) >  FINDINGS:   The endotracheal tube is above the zach. Nasogastric tube overlies the   stomach region.  HEART:difficult to access in this projection  LUNGS: free of consolidation or effusion.    OSSEOUS STRUCTURES:: degenerative changes    IMPRESSION:   No infiltrates.    < from: CT Head No Cont (11.28.17 @ 06:39) >  IMPRESSION: No intracranial hemorrhage or evidence for acute territorial   infarct.     Age indeterminant left thalamic infarct. Status post right pterional   craniotomy with subjacent encephalomalacia/gliosis. Asymmetric left   frontal white matter lucency may represent additional focus of gliosis.   Comparison with prior imaging recommended.        CRITICAL CARE TIME SPENT: 42 mins  Evaluating/treating patient, reviewing data/labs/imaging, discussing case with multidisciplinary team, discussing plan/goals of care with patient/family. Non-inclusive of procedure time.

## 2017-11-29 NOTE — PROGRESS NOTE ADULT - PROBLEM SELECTOR PLAN 1
Continue Keppra. Consider adding second agent given abnormal EEG findings, however, no seizure activity thus far. Will use Ativan to suppress break through seizure activity.

## 2017-11-29 NOTE — PROGRESS NOTE ADULT - PROBLEM SELECTOR PLAN 5
Hold antihypertensives for now in setting of high dose propofol and several other sedative agents on board. BP stable.

## 2017-11-29 NOTE — PROGRESS NOTE ADULT - SUBJECTIVE AND OBJECTIVE BOX
INTERVAL HISTORY:  no interval changes. No seizures. Remains intubated, sedated. cEEg inplace      VITAL SIGNS:  Vital Signs Last 24 Hrs  T(C): 36.7 (29 Nov 2017 08:00), Max: 36.8 (29 Nov 2017 04:00)  T(F): 98.1 (29 Nov 2017 08:00), Max: 98.2 (29 Nov 2017 04:00)  HR: 54 (29 Nov 2017 10:00) (54 - 124)  BP: 112/58 (29 Nov 2017 10:00) (94/54 - 184/90)  BP(mean): 80 (29 Nov 2017 10:00) (69 - 129)  RR: 14 (29 Nov 2017 10:00) (14 - 24)  SpO2: 100% (29 Nov 2017 10:00) (97% - 100%)    PHYSICAL EXAMINATION:    Mentation:   unresponsive, intubated  Language/Speech: n/a  CN: PERRL  Visual Fields: no blink to threat  Motor: no response to noxius stim  Sensory:  DTR:  Babinski:      MEDS:  MEDICATIONS  (STANDING):  chlorhexidine 0.12% Liquid 15 milliLiter(s) Swish and Spit two times a day  dexmedetomidine Infusion 0.5 MICROgram(s)/kG/Hr (9.35 mL/Hr) IV Continuous <Continuous>  enoxaparin Injectable 40 milliGRAM(s) SubCutaneous daily  fentaNYL    Injectable 50 MICROGram(s) IV Push once  levETIRAcetam  IVPB 500 milliGRAM(s) IV Intermittent every 12 hours  multiple electrolytes Injection Type 1 1000 milliLiter(s) (100 mL/Hr) IV Continuous <Continuous>  pantoprazole  Injectable 40 milliGRAM(s) IV Push daily  propofol Infusion 5 MICROgram(s)/kG/Min (2.244 mL/Hr) IV Continuous <Continuous>    MEDICATIONS  (PRN):  fentaNYL    Injectable 25 MICROGram(s) IV Push every 3 hours PRN Severe Pain (7 - 10)      LABS:                          11.7   10.5  )-----------( 154      ( 29 Nov 2017 05:03 )             34.4     11-29    139  |  103  |  23.0<H>  ----------------------------<  132<H>  3.9   |  23.0  |  1.43<H>    Ca    9.1      29 Nov 2017 05:03  Phos  2.7     11-29  Mg     2.0     11-29    TPro  7.8  /  Alb  4.1  /  TBili  0.3<L>  /  DBili  x   /  AST  23  /  ALT  13  /  AlkPhos  83  11-28    LIVER FUNCTIONS - ( 28 Nov 2017 06:03 )  Alb: 4.1 g/dL / Pro: 7.8 g/dL / ALK PHOS: 83 U/L / ALT: 13 U/L / AST: 23 U/L / GGT: x               RADIOLOGY & ADDITIONAL STUDIES:    < from: EEG Awake and Asleep (11.28.17 @ 12:44) >  IMPRESSION: This is an abnormal EEG recording. It consists consists of   severe diffuse low voltage of slowing. It is noted the patient is sedated   with Ativan Precedex and propofol. There might be a very brief period of   wakefulness at the very end of the recording.  There is a continuous breach rhythm throughout the recording over the   right hemisphere.  No definitive seizure activity is seen.    < end of copied text >      cEEG- rare right fronto central sharp and spike activity    IMPRESSION & PLAN:    Seizures,   h/o grade III astrocytoma 2001  h/o stroke  h/o libman-sachs vegetations  Concern for tumor growth- last MRI was several years ago  r/o new stroke  Epilepsy    REC:  Await MRI  Critical care management  Continue Keppra

## 2017-11-29 NOTE — PROGRESS NOTE ADULT - PROBLEM SELECTOR PLAN 3
Continue mechanical ventilation for now, will titrate to keep SaO2 > 92% and wean as tolerated. SBT this morning. May not be ready for extubation in terms of mental status given his sedation requirements overnight.

## 2017-11-29 NOTE — PROGRESS NOTE ADULT - ATTENDING COMMENTS
I have seen the patietn and agree with above with the folowing additions:    seizure no further seizures on EEG will dc continue keppra appreciate neuro recs  agitation better will plan on extubation  MRI pending    Updated wife at bedside

## 2017-11-30 DIAGNOSIS — R56.9 UNSPECIFIED CONVULSIONS: ICD-10-CM

## 2017-11-30 LAB
ANION GAP SERPL CALC-SCNC: 14 MMOL/L — SIGNIFICANT CHANGE UP (ref 5–17)
BUN SERPL-MCNC: 20 MG/DL — SIGNIFICANT CHANGE UP (ref 8–20)
CALCIUM SERPL-MCNC: 9 MG/DL — SIGNIFICANT CHANGE UP (ref 8.6–10.2)
CHLORIDE SERPL-SCNC: 102 MMOL/L — SIGNIFICANT CHANGE UP (ref 98–107)
CO2 SERPL-SCNC: 25 MMOL/L — SIGNIFICANT CHANGE UP (ref 22–29)
CREAT SERPL-MCNC: 1.6 MG/DL — HIGH (ref 0.5–1.3)
GLUCOSE BLDC GLUCOMTR-MCNC: 128 MG/DL — HIGH (ref 70–99)
GLUCOSE SERPL-MCNC: 98 MG/DL — SIGNIFICANT CHANGE UP (ref 70–115)
HCT VFR BLD CALC: 33.3 % — LOW (ref 42–52)
HGB BLD-MCNC: 11.6 G/DL — LOW (ref 14–18)
MAGNESIUM SERPL-MCNC: 1.9 MG/DL — SIGNIFICANT CHANGE UP (ref 1.6–2.6)
MCHC RBC-ENTMCNC: 29.4 PG — SIGNIFICANT CHANGE UP (ref 27–31)
MCHC RBC-ENTMCNC: 34.8 G/DL — SIGNIFICANT CHANGE UP (ref 32–36)
MCV RBC AUTO: 84.3 FL — SIGNIFICANT CHANGE UP (ref 80–94)
PHOSPHATE SERPL-MCNC: 2.2 MG/DL — LOW (ref 2.4–4.7)
PLATELET # BLD AUTO: 157 K/UL — SIGNIFICANT CHANGE UP (ref 150–400)
POTASSIUM SERPL-MCNC: 3.4 MMOL/L — LOW (ref 3.5–5.3)
POTASSIUM SERPL-SCNC: 3.4 MMOL/L — LOW (ref 3.5–5.3)
RBC # BLD: 3.95 M/UL — LOW (ref 4.6–6.2)
RBC # FLD: 12.8 % — SIGNIFICANT CHANGE UP (ref 11–15.6)
SODIUM SERPL-SCNC: 141 MMOL/L — SIGNIFICANT CHANGE UP (ref 135–145)
WBC # BLD: 10.3 K/UL — SIGNIFICANT CHANGE UP (ref 4.8–10.8)
WBC # FLD AUTO: 10.3 K/UL — SIGNIFICANT CHANGE UP (ref 4.8–10.8)

## 2017-11-30 PROCEDURE — 99232 SBSQ HOSP IP/OBS MODERATE 35: CPT

## 2017-11-30 RX ORDER — VALSARTAN 80 MG/1
160 TABLET ORAL DAILY
Qty: 0 | Refills: 0 | Status: DISCONTINUED | OUTPATIENT
Start: 2017-11-30 | End: 2017-12-01

## 2017-11-30 RX ORDER — OXYCODONE AND ACETAMINOPHEN 5; 325 MG/1; MG/1
1 TABLET ORAL EVERY 4 HOURS
Qty: 0 | Refills: 0 | Status: DISCONTINUED | OUTPATIENT
Start: 2017-11-30 | End: 2017-12-01

## 2017-11-30 RX ORDER — SODIUM,POTASSIUM PHOSPHATES 278-250MG
1 POWDER IN PACKET (EA) ORAL
Qty: 0 | Refills: 0 | Status: DISCONTINUED | OUTPATIENT
Start: 2017-11-30 | End: 2017-12-01

## 2017-11-30 RX ORDER — AMLODIPINE BESYLATE 2.5 MG/1
2.5 TABLET ORAL DAILY
Qty: 0 | Refills: 0 | Status: DISCONTINUED | OUTPATIENT
Start: 2017-11-30 | End: 2017-11-30

## 2017-11-30 RX ORDER — VALSARTAN 80 MG/1
40 TABLET ORAL DAILY
Qty: 0 | Refills: 0 | Status: DISCONTINUED | OUTPATIENT
Start: 2017-11-30 | End: 2017-11-30

## 2017-11-30 RX ORDER — HYDRALAZINE HCL 50 MG
5 TABLET ORAL ONCE
Qty: 0 | Refills: 0 | Status: COMPLETED | OUTPATIENT
Start: 2017-11-30 | End: 2017-11-30

## 2017-11-30 RX ORDER — LEVETIRACETAM 250 MG/1
500 TABLET, FILM COATED ORAL
Qty: 0 | Refills: 0 | Status: DISCONTINUED | OUTPATIENT
Start: 2017-11-30 | End: 2017-12-01

## 2017-11-30 RX ORDER — QUETIAPINE FUMARATE 200 MG/1
25 TABLET, FILM COATED ORAL
Qty: 0 | Refills: 0 | Status: DISCONTINUED | OUTPATIENT
Start: 2017-11-30 | End: 2017-11-30

## 2017-11-30 RX ORDER — POTASSIUM PHOSPHATE, MONOBASIC POTASSIUM PHOSPHATE, DIBASIC 236; 224 MG/ML; MG/ML
15 INJECTION, SOLUTION INTRAVENOUS ONCE
Qty: 0 | Refills: 0 | Status: DISCONTINUED | OUTPATIENT
Start: 2017-11-30 | End: 2017-11-30

## 2017-11-30 RX ADMIN — VALSARTAN 40 MILLIGRAM(S): 80 TABLET ORAL at 17:04

## 2017-11-30 RX ADMIN — LEVETIRACETAM 500 MILLIGRAM(S): 250 TABLET, FILM COATED ORAL at 06:47

## 2017-11-30 RX ADMIN — LEVETIRACETAM 500 MILLIGRAM(S): 250 TABLET, FILM COATED ORAL at 17:18

## 2017-11-30 RX ADMIN — Medication 5 MILLIGRAM(S): at 18:49

## 2017-11-30 RX ADMIN — Medication 1 TABLET(S): at 17:04

## 2017-11-30 RX ADMIN — Medication 1 TABLET(S): at 08:38

## 2017-11-30 RX ADMIN — ENOXAPARIN SODIUM 40 MILLIGRAM(S): 100 INJECTION SUBCUTANEOUS at 11:13

## 2017-11-30 RX ADMIN — Medication 1 TABLET(S): at 21:01

## 2017-11-30 RX ADMIN — CHLORHEXIDINE GLUCONATE 15 MILLILITER(S): 213 SOLUTION TOPICAL at 06:48

## 2017-11-30 RX ADMIN — Medication 1 TABLET(S): at 11:13

## 2017-11-30 NOTE — PROGRESS NOTE ADULT - PROBLEM SELECTOR PLAN 1
EEG shows no seizure activity   c/w PO keppra and seizure precautions  f/u neurology recommendations

## 2017-11-30 NOTE — PROGRESS NOTE ADULT - PROBLEM SELECTOR PLAN 2
resolved now  f/u behavorial health consult
awaiting behavioral consult.  monitor clinically.
Waking up again violently. Propofol added back, escalating doses of fentanyl pushes, 2mg IV Ativan given which seemed to have good effect. Now calm. Continue restraints.

## 2017-11-30 NOTE — PROGRESS NOTE ADULT - SUBJECTIVE AND OBJECTIVE BOX
INTERVAL HISTORY:  doing well. At neurological baseline. Family and patient wants to go home      VITAL SIGNS:  Vital Signs Last 24 Hrs  T(C): 37 (30 Nov 2017 12:00), Max: 37.5 (30 Nov 2017 08:00)  T(F): 98.6 (30 Nov 2017 12:00), Max: 99.5 (30 Nov 2017 08:00)  HR: 67 (30 Nov 2017 12:00) (50 - 89)  BP: 179/85 (30 Nov 2017 12:00) (133/63 - 183/86)  BP(mean): 122 (30 Nov 2017 12:00) (91 - 123)  RR: 15 (30 Nov 2017 12:00) (15 - 50)  SpO2: 98% (29 Nov 2017 20:01) (98% - 100%)    PHYSICAL EXAMINATION:    Mentation:  awake and cooperative. Oriented to name place. Knows it was Thanksgiving last week. Cannot tell me the months.  Per sister- this is his baseline  Language/Speech:nl  CN: nl  Visual Fields: full  Motor: no weakness  Sensory: symm  DTR:  Babinski:      MEDS:  MEDICATIONS  (STANDING):  enoxaparin Injectable 40 milliGRAM(s) SubCutaneous daily  levETIRAcetam 500 milliGRAM(s) Oral two times a day  potassium acid phosphate/sodium acid phosphate tablet (K-PHOS No. 2) 1 Tablet(s) Oral four times a day with meals  QUEtiapine 25 milliGRAM(s) Oral two times a day    MEDICATIONS  (PRN):  oxyCODONE    5 mG/acetaminophen 325 mG 1 Tablet(s) Oral every 4 hours PRN Moderate Pain (4 - 6)      LABS:                          11.6   10.3  )-----------( 157      ( 30 Nov 2017 05:40 )             33.3     11-30    141  |  102  |  20.0  ----------------------------<  98  3.4<L>   |  25.0  |  1.60<H>    Ca    9.0      30 Nov 2017 05:40  Phos  2.2     11-30  Mg     1.9     11-30            RADIOLOGY & ADDITIONAL STUDIES:    < from: MR Head w/ IV Cont (11.29.17 @ 16:00) >  Right pterional craniotomy and postsurgical changes with cystic   encephalomalacia right anterior temporal lobe/temporal tip. No diffusion   abnormality to represent acute infarction.    Focal area of encephalomalacia and adjacent T2 signal abnormality without   enhancement adjacent to the frontal horn left lateral ventricle in   addition to small patchy nonenhancing T2 signal abnormality left anterior   thalamus adjacent to the third ventricle. Short-term follow-up exam is   suggested to ensure stability.    Nonspecific foci of T2 prolongation in the periventriculardeep white   matter distribution suggestive of mild small vessel ischemic changes.                < end of copied text >      IMPRESSION & PLAN:    Patient is medically stable and at his neurolgic baseline. MRI is satisfactory without evidence of acute stroke, definitive tumor recurrence etc  Epilepsy/Seizure Disorder    REC:  Continue Keppra 500mg BID  Neurologically cleared for discharge  Will not actively follow.   Please recontact as needed.  Follow up in office in 4-6weeks as instructed.

## 2017-11-30 NOTE — PROGRESS NOTE ADULT - ASSESSMENT
63 y/o male pmhx of lupus, astrocytoma with h/o of crani/resection at [facility] in 2001, Libman-sacks endocarditis, HTN, HLD, Blaine Royal's Syndrome from phenobarbital.  Admitted to r/o seizure activity, intubated in the ED for airway protection after multiple doses of ativan for combativeness. 24hr EEG negative for seizure activity and MRI of head negative for any acute events.
62 YOM h/o crani/resection of right temporal astrocytoma at [facility]/Dr Sandy in 2001, Lupus, TIA/CVA, Libman-Sacks endocarditis, HTN, HLD, with status epilepticus, hyperactive post-ictal delirium, acute respiratory failure requiring intubation.
62 years old male with PMH of Astrocytoma, HTN, Dyslipidemia and CVA admitted to ICU on 11/28 with new onset seizure activity , intubated in the ER to protect the airway, seen by neuro, started on Keppra , had negative MRI of the head, extubated on 11/29 , remained stable and now downgraded to medical floor.

## 2017-11-30 NOTE — PROGRESS NOTE ADULT - PROBLEM SELECTOR PLAN 4
Possible recurrence? May require further imaging. Neurology following.
s/o resection in 2001.  no acute issues.

## 2017-11-30 NOTE — PROGRESS NOTE ADULT - SUBJECTIVE AND OBJECTIVE BOX
Acceptance Note.    PERRI LORA  Male   62y    [id number]              INTERVAL HPI/OVERNIGHT EVENTS:  Pt seen and examined at bedside, no overnight event reported by night staff. Pt reported doing well and has no active complaints.    REVIEW OF SYSTEMS:  CONSTITUTIONAL: No fever, weight loss, or fatigue  RESPIRATORY: No cough, wheezing, hemoptysis; No shortness of breath  CARDIOVASCULAR: No chest pain, palpitations  GASTROINTESTINAL: No abdominal or epigastric pain. No nausea, vomiting  NEUROLOGICAL: No headaches,  loss of strength.  MISCELLANEOUS: No joint swelling or pain     PHYSICAL EXAM:    Vital Signs Last 24 Hrs  T(C): 37 (30 Nov 2017 12:00), Max: 37.5 (30 Nov 2017 08:00)  T(F): 98.6 (30 Nov 2017 12:00), Max: 99.5 (30 Nov 2017 08:00)  HR: 67 (30 Nov 2017 12:00) (51 - 89)  BP: 179/85 (30 Nov 2017 12:00) (142/67 - 183/86)  BP(mean): 122 (30 Nov 2017 12:00) (97 - 123)  RR: 15 (30 Nov 2017 12:00) (15 - 50)  SpO2: 98% (29 Nov 2017 20:01) (98% - 100%)    GENERAL: Elderly male looking   HEENT: PERRL, +EOMI  NECK: soft, Supple, No JVD,   CHEST/LUNG: Clear to auscultate bilaterally; No wheezing  HEART: S1S2+, Regular rate and rhythm; No murmurs, rubs, or gallops  ABDOMEN: Soft, Nontender, Nondistended; Bowel sounds present  EXTREMITIES:  2+ Peripheral Pulses, No clubbing, cyanosis, or edema  SKIN: No rashes or lesions  NEURO: AAOX3, no focal deficits, no motor r sensory loss  PSYCH: normal mood    I&O's Summary    29 Nov 2017 07:01  -  30 Nov 2017 07:00  --------------------------------------------------------  IN: 1488.8 mL / OUT: 1160 mL / NET: 328.8 mL        MEDICATIONS  (STANDING):  enoxaparin Injectable 40 milliGRAM(s) SubCutaneous daily  levETIRAcetam 500 milliGRAM(s) Oral two times a day  potassium acid phosphate/sodium acid phosphate tablet (K-PHOS No. 2) 1 Tablet(s) Oral four times a day with meals    MEDICATIONS  (PRN):  oxyCODONE    5 mG/acetaminophen 325 mG 1 Tablet(s) Oral every 4 hours PRN Moderate Pain (4 - 6)            LABS:                        11.6   10.3  )-----------( 157      ( 30 Nov 2017 05:40 )             33.3     11-30    141  |  102  |  20.0  ----------------------------<  98  3.4<L>   |  25.0  |  1.60<H>    Ca    9.0      30 Nov 2017 05:40  Phos  2.2     11-30  Mg     1.9     11-30 Acceptance Note.    PERRI LORA  Male   62y    [id number]        62 years old male with PMH of right temporal astrocytoma  in 2001, Lupus, TIA/CVA, Libman-Sacks endocarditis, HTN, HLD brought in to the ER on  11/28 for thee valuation of seizure activity.  As per wife she heard pt scream and then found him on the floor in his room with what she describes as tonic/clonic seizure activity.  Upon arrival patient was noted to be in  post-ictal stat and became  very combative requiring multiple doses of Ativan and ultimately required intubation. Patient was seen by neuro and started on Keppra. He was extubated on 11/29 successfully . He is downgraded to medical floor now.    REVIEW OF SYSTEMS:  CONSTITUTIONAL: No fever, weight loss, or fatigue  RESPIRATORY: No cough, wheezing, hemoptysis; No shortness of breath  CARDIOVASCULAR: No chest pain, palpitations  GASTROINTESTINAL: No abdominal or epigastric pain. No nausea, vomiting  NEUROLOGICAL: No headaches,  loss of strength.  MISCELLANEOUS: No joint swelling or pain     PHYSICAL EXAM:    Vital Signs Last 24 Hrs  T(C): 37 (30 Nov 2017 12:00), Max: 37.5 (30 Nov 2017 08:00)  T(F): 98.6 (30 Nov 2017 12:00), Max: 99.5 (30 Nov 2017 08:00)  HR: 67 (30 Nov 2017 12:00) (51 - 89)  BP: 179/85 (30 Nov 2017 12:00) (142/67 - 183/86)  BP(mean): 122 (30 Nov 2017 12:00) (97 - 123)  RR: 15 (30 Nov 2017 12:00) (15 - 50)  SpO2: 98% (29 Nov 2017 20:01) (98% - 100%)    GENERAL: Elderly male looking   HEENT: PERRL, +EOMI  NECK: soft, Supple, No JVD,   CHEST/LUNG: Clear to auscultate bilaterally; No wheezing  HEART: S1S2+, Regular rate and rhythm; No murmurs, rubs, or gallops  ABDOMEN: Soft, Nontender, Nondistended; Bowel sounds present  EXTREMITIES:  2+ Peripheral Pulses, No clubbing, cyanosis, or edema  SKIN: No rashes or lesions  NEURO: AAOX3, no focal deficits, no motor r sensory loss  PSYCH: normal mood    I&O's Summary    29 Nov 2017 07:01  -  30 Nov 2017 07:00  --------------------------------------------------------  IN: 1488.8 mL / OUT: 1160 mL / NET: 328.8 mL        MEDICATIONS  (STANDING):  enoxaparin Injectable 40 milliGRAM(s) SubCutaneous daily  levETIRAcetam 500 milliGRAM(s) Oral two times a day  potassium acid phosphate/sodium acid phosphate tablet (K-PHOS No. 2) 1 Tablet(s) Oral four times a day with meals    MEDICATIONS  (PRN):  oxyCODONE    5 mG/acetaminophen 325 mG 1 Tablet(s) Oral every 4 hours PRN Moderate Pain (4 - 6)      LABS:                        11.6   10.3  )-----------( 157      ( 30 Nov 2017 05:40 )             33.3     11-30    141  |  102  |  20.0  ----------------------------<  98  3.4<L>   |  25.0  |  1.60<H>    Ca    9.0      30 Nov 2017 05:40  Phos  2.2     11-30  Mg     1.9     11-30

## 2017-11-30 NOTE — PROGRESS NOTE ADULT - ATTENDING COMMENTS
Full code Full code    RR was called on this patient due to elevated BP, 178/100. Patient asymptomatic. 5v mg of IV hydralazine was ordered and Diovan was increased to 160 mg daily.

## 2017-11-30 NOTE — DIETITIAN INITIAL EVALUATION ADULT. - OTHER INFO
Pt admitted s/p seizure, noted hx of lupus, CVA. Pt with hx of short term memory loss. PO diet started, pt tolerating it well with fair intake this AM. Diet education provided to pt/wife.

## 2017-11-30 NOTE — PHYSICAL THERAPY INITIAL EVALUATION ADULT - ADDITIONAL COMMENTS
Pt lives in a house with 3 steps to enter - holds onto a wall, and no steps inside. Spouse is home providing 24/7 assist/supervision at baseline.

## 2017-11-30 NOTE — PROGRESS NOTE ADULT - PROBLEM SELECTOR PLAN 1
no seizure activity in the last 24 hours.  MRI of head showed no acute changes.  EEG howing no seizure activity in the last 24 hours.  MRI of head showed no acute changes.  EEG showing right hemisphere post-operative changes, improving  encephalopathy and right frontal interictal epileptiform focus with risk for partial seizures. no seizure activity in the last 24 hours.  MRI of head showed no acute changes.  EEG showing right hemisphere post-operative changes, improving  encephalopathy and right frontal interictal epileptiform focus with risk for partial seizures.  neurology on board.  continue Keppra 500 mg bid.  seizure precautions.

## 2017-11-30 NOTE — PROGRESS NOTE ADULT - PROBLEM SELECTOR PLAN 3
Anti hypertensive medications have been discontinued for now.  BP in nor Anti hypertensive medications have been discontinued.  SBP in 170's and 180'S.  will start Diovan 40 mg daily  monitor BP and re-eval

## 2017-11-30 NOTE — PROGRESS NOTE ADULT - SUBJECTIVE AND OBJECTIVE BOX
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 Patient is noncompliant with following up with outpatient appointments.  At baseline patient's wife states, short term memory loss from his prior CVA and needs constant redirection. Wife states she found on the floor in his room describing a seizure activity. In the ED the patient became very agitated and combative after arriving in post ictal, he received multiple doses of Ativan which then required intubation for airway protection.  He required multiple ativan doses for agitation during the EEG.     Events last 24 hours:  24 hour EEG showed no seizure activity. Patient was extubated this morning, off propofol and precedex gtt. Patient required 1:1 observation when wife or sister was not sitting at bedtime becoming very agitated refusing to take meds and taking out his IV access.  MRI of the head today was negative.     PAST MEDICAL & SURGICAL HISTORY:  Libman-Sacks endocarditis  Lupus  CVA (cerebral vascular accident)  TIA (transient ischemic attack)  HTN (hypertension)  Astrocytoma brain tumor  Other meniscus derangements: repair  History of appendectomy  H/O brain surgery      Review of Systems:  CONSTITUTIONAL: No fever, chills, or fatigue  EYES: No eye pain, visual disturbances, or discharge  ENMT:  No difficulty hearing, tinnitus, vertigo; No sinus or throat pain  RESPIRATORY: No cough, wheezing, chills or hemoptysis; No shortness of breath  CARDIOVASCULAR: No chest pain, palpitations, dizziness, or leg swelling  GASTROINTESTINAL: No abdominal or epigastric pain. No nausea, vomiting, or hematemesis; No diarrhea or constipatioGENITOURINARY: No dysuria, frequency, hematuria, or incontinence  NEUROLOGICAL: No headaches, memory loss, loss of strength, numbness, or tremors  MUSCULOSKELETAL: No joint pain or swelling; No muscle, back, or extremity pain        Medications:    hydrALAZINE Injectable 5 milliGRAM(s) IV Push once  valsartan 40 milliGRAM(s) Oral daily  levETIRAcetam 500 milliGRAM(s) Oral two times a day  oxyCODONE    5 mG/acetaminophen 325 mG 1 Tablet(s) Oral every 4 hours PRN  enoxaparin Injectable 40 milliGRAM(s) SubCutaneous daily  potassium acid phosphate/sodium acid phosphate tablet (K-PHOS No. 2) 1 Tablet(s) Oral four times a day with meals      ICU Vital Signs Last 24 Hrs  T(C): 36.7 (30 Nov 2017 16:00), Max: 37.5 (30 Nov 2017 08:00)  T(F): 98.1 (30 Nov 2017 16:00), Max: 99.5 (30 Nov 2017 08:00)  HR: 70 (30 Nov 2017 17:00) (52 - 89)  BP: 179/88 (30 Nov 2017 17:00) (155/74 - 183/86)  BP(mean): 126 (30 Nov 2017 17:00) (107 - 126)  ABP: --  ABP(mean): --  RR: 17 (30 Nov 2017 17:00) (15 - 36)  SpO2: 98% (29 Nov 2017 20:01) (98% - 100%)          I&O's Detail    29 Nov 2017 07:01  -  30 Nov 2017 07:00  --------------------------------------------------------  IN:    dexmedetomidine Infusion: 94.9 mL    dexmedetomidine Infusion: 108.9 mL    multiple electrolytes Injection Type 1multiple electrolytes Injection Type 1: 1200 mL    propofol Infusion: 85 mL  Total IN: 1488.8 mL    OUT:    Indwelling Catheter - Urethral: 325 mL    Nasoenteral Tube: 100 mL    Voided: 735 mL  Total OUT: 1160 mL    Total NET: 328.8 mL            LABS:                        11.6   10.3  )-----------( 157      ( 30 Nov 2017 05:40 )             33.3     11-30    141  |  102  |  20.0  ----------------------------<  98  3.4<L>   |  25.0  |  1.60<H>    Ca    9.0      30 Nov 2017 05:40  Phos  2.2     11-30  Mg     1.9     11-30            CAPILLARY BLOOD GLUCOSE      POCT Blood Glucose.: 128 mg/dL (30 Nov 2017 18:20)        CULTURES:      Physical Examination:    General: No acute distress.  AAOx3    HEENT: Pupils equal, reactive to light.  Symmetric.    PULM: Clear B/L breath sounds, no significant sputum production    CVS: Regular rate and rhythm, no murmurs, rubs, or gallops    ABD: Soft, nondistended, nontender, normoactive bowel sounds,     EXT: No edema, nontender    SKIN: Warm and well perfused, no rashes noted.    NEURO: A&Ox3, strength 5/5 all extremities, no focal deficits    RADIOLOGY:   < from: MR Head w/ IV Cont (11.29.17 @ 16:00) >  IMPRESSION:    Right pterional craniotomy and postsurgical changes with cystic   encephalomalacia right anterior temporal lobe/temporal tip. No diffusion   abnormality to represent acute infarction.    Focal area of encephalomalacia and adjacent T2 signal abnormality without   enhancement adjacent to the frontal horn left lateral ventricle in   addition to small patchy nonenhancing T2 signal abnormality left anterior   thalamus adjacent to the third ventricle. Short-term follow-up exam is   suggested to ensure stability.    Nonspecific foci of T2 prolongation in the periventriculardeep white   matter distribution suggestive of mild small vessel ischemic changes.    < end of copied text >

## 2017-11-30 NOTE — CHART NOTE - NSCHARTNOTEFT_GEN_A_CORE
62 years old male with PMH of right temporal astrocytoma  in 2001, Lupus, TIA/CVA, Libman-Sacks endocarditis, HTN, HLD was admitted to ICU for seizure like activity and downgraded from ICU today. Rapid response was called because patient's blood pressure was 199/110.   Patient denies chest pain, shortness of breath,, loss of motor strength, double vision, blurred vision.  Patient is alert and oriented x 3.     Vitals:   Blood pressure:  178/100 on repeat    RR: 16    HR: 76   sPo2: 95% on room air   Blood glucose: 210    Physical exam:   General: NAD, lying comfortably in bed    Neck: No JVD,  Cardiac: s1/s2/rrr/no s4 or s3, no murmurs  Abdominal: soft, no guarding or rebound, bowel sounds positive  Neurological: alert and oriented x 3       Labs:                         11.6   10.3  )-----------( 157      ( 30 Nov 2017 05:40 )             33.3       11-30    141  |  102  |  20.0  ----------------------------<  98  3.4<L>   |  25.0  |  1.60<H>    Ca    9.0      30 Nov 2017 05:40  Phos  2.2     11-30  Mg     1.9     11-30          A/P: 62 years old male  admitted to ICU for seizure like activity and downgraded from ICU today. Rapid response was called because patient's blood pressure was 199/110.    - patient denies all symptoms, alert and oriented x 3   - IV hydralazine 5 mg IV push for now  - patient will need long acting blood pressure control  - discussed with attending Dr. Goyal who agrees with management

## 2017-12-01 ENCOUNTER — TRANSCRIPTION ENCOUNTER (OUTPATIENT)
Age: 62
End: 2017-12-01

## 2017-12-01 VITALS
DIASTOLIC BLOOD PRESSURE: 76 MMHG | TEMPERATURE: 98 F | RESPIRATION RATE: 18 BRPM | HEART RATE: 82 BPM | SYSTOLIC BLOOD PRESSURE: 164 MMHG

## 2017-12-01 PROCEDURE — 99239 HOSP IP/OBS DSCHRG MGMT >30: CPT

## 2017-12-01 RX ORDER — AMLODIPINE BESYLATE 2.5 MG/1
5 TABLET ORAL DAILY
Qty: 0 | Refills: 0 | Status: DISCONTINUED | OUTPATIENT
Start: 2017-12-01 | End: 2017-12-01

## 2017-12-01 RX ORDER — LEVETIRACETAM 250 MG/1
1 TABLET, FILM COATED ORAL
Qty: 60 | Refills: 0 | OUTPATIENT
Start: 2017-12-01 | End: 2017-12-31

## 2017-12-01 RX ORDER — AMLODIPINE BESYLATE 2.5 MG/1
1 TABLET ORAL
Qty: 30 | Refills: 0 | OUTPATIENT
Start: 2017-12-01 | End: 2017-12-31

## 2017-12-01 RX ADMIN — LEVETIRACETAM 500 MILLIGRAM(S): 250 TABLET, FILM COATED ORAL at 06:14

## 2017-12-01 RX ADMIN — AMLODIPINE BESYLATE 5 MILLIGRAM(S): 2.5 TABLET ORAL at 11:28

## 2017-12-01 RX ADMIN — VALSARTAN 160 MILLIGRAM(S): 80 TABLET ORAL at 06:14

## 2017-12-01 RX ADMIN — Medication 1 TABLET(S): at 08:21

## 2017-12-01 NOTE — DISCHARGE NOTE ADULT - PLAN OF CARE
prevention follow up with primary care doctor within 1 week.  follow  up with neurology in 4-6 weeks. resolved maintain log of blood pressures at home  continue amlodipine. follow up with PMD seems at baseline  follow up with PMD

## 2017-12-01 NOTE — DISCHARGE NOTE ADULT - MEDICATION SUMMARY - MEDICATIONS TO TAKE
I will START or STAY ON the medications listed below when I get home from the hospital:    levETIRAcetam 500 mg oral tablet  -- 1 tab(s) by mouth 2 times a day  -- Indication: For Seizure    amLODIPine 5 mg oral tablet  -- 1 tab(s) by mouth once a day  -- Indication: For Hypertension

## 2017-12-01 NOTE — DISCHARGE NOTE ADULT - PATIENT PORTAL LINK FT
“You can access the [facility] Patient Portal, offered by [facility], by registering with the following website: [email or web address]”

## 2017-12-01 NOTE — DISCHARGE NOTE ADULT - SECONDARY DIAGNOSIS.
Metabolic encephalopathy Essential hypertension CKD (chronic kidney disease) stage 3, GFR 30-59 ml/min Astrocytoma brain tumor Lupus

## 2017-12-01 NOTE — DISCHARGE NOTE ADULT - HOSPITAL COURSE
62 years old male with PMH of right temporal astrocytoma in 2001, Lupus, TIA/CVA, Libman-Sacks endocarditis, HTN, HLD brought in to the ER on 11/28 for evaluation of seizure activity described as tonic/clonic seizure activity.  Upon arrival patient was noted to be in  post-ictal stat and became very combative requiring multiple doses of Ativan and ultimately required intubation. Patient was seen by neuro and started on Keppra. He was extubated on 11/29 successfully and maintained on keppra.    patient hypertensive at times, improved with hydralazine. wife states patient non compliant with amlodipine 5mg at home. unaware of any kidney disease. Creatinine elevated but stable.    d/c home. d/c planning 35 min.  no acute complaints. RRR s1s2 CTAB soft abdomen  no LE edema. nonfocal neuro. follows directions, ambulatory 62 years old male with PMH of right temporal astrocytoma in 2001, Lupus, TIA/CVA, Libman-Sacks endocarditis, HTN, HLD brought in to the ER on 11/28 for evaluation of seizure activity described as tonic/clonic seizure activity.  Upon arrival patient was noted to be in  post-ictal stat and became very combative requiring multiple doses of Ativan and ultimately required intubation. Patient was seen by neuro and started on Keppra. He was extubated on 11/29 successfully and maintained on keppra. MRI and EEG done.    patient hypertensive at times, improved with hydralazine. wife states patient non compliant with amlodipine 5mg at home. unaware of any kidney disease. Creatinine elevated but stable.    d/c home. d/c planning 35 min.  no acute complaints. RRR s1s2 CTAB soft abdomen  no LE edema. nonfocal neuro. follows directions, ambulatory

## 2017-12-01 NOTE — DISCHARGE NOTE ADULT - CARE PLAN
Principal Discharge DX:	Seizure  Goal:	prevention  Instructions for follow-up, activity and diet:	follow up with primary care doctor within 1 week.  follow  up with neurology in 4-6 weeks.  Secondary Diagnosis:	Metabolic encephalopathy  Instructions for follow-up, activity and diet:	resolved  Secondary Diagnosis:	Essential hypertension  Instructions for follow-up, activity and diet:	maintain log of blood pressures at home  continue amlodipine. follow up with PMD  Secondary Diagnosis:	CKD (chronic kidney disease) stage 3, GFR 30-59 ml/min  Instructions for follow-up, activity and diet:	seems at baseline  follow up with PMD  Secondary Diagnosis:	Astrocytoma brain tumor  Secondary Diagnosis:	Lupus

## 2017-12-19 PROCEDURE — 83735 ASSAY OF MAGNESIUM: CPT

## 2017-12-19 PROCEDURE — 36415 COLL VENOUS BLD VENIPUNCTURE: CPT

## 2017-12-19 PROCEDURE — 70450 CT HEAD/BRAIN W/O DYE: CPT

## 2017-12-19 PROCEDURE — 85027 COMPLETE CBC AUTOMATED: CPT

## 2017-12-19 PROCEDURE — 95819 EEG AWAKE AND ASLEEP: CPT

## 2017-12-19 PROCEDURE — 96376 TX/PRO/DX INJ SAME DRUG ADON: CPT | Mod: XU

## 2017-12-19 PROCEDURE — 70552 MRI BRAIN STEM W/DYE: CPT

## 2017-12-19 PROCEDURE — 99291 CRITICAL CARE FIRST HOUR: CPT | Mod: 25

## 2017-12-19 PROCEDURE — 96374 THER/PROPH/DIAG INJ IV PUSH: CPT | Mod: XU

## 2017-12-19 PROCEDURE — 71045 X-RAY EXAM CHEST 1 VIEW: CPT

## 2017-12-19 PROCEDURE — 80053 COMPREHEN METABOLIC PANEL: CPT

## 2017-12-19 PROCEDURE — 85730 THROMBOPLASTIN TIME PARTIAL: CPT

## 2017-12-19 PROCEDURE — 94002 VENT MGMT INPAT INIT DAY: CPT

## 2017-12-19 PROCEDURE — 84484 ASSAY OF TROPONIN QUANT: CPT

## 2017-12-19 PROCEDURE — 95951: CPT

## 2017-12-19 PROCEDURE — 94003 VENT MGMT INPAT SUBQ DAY: CPT

## 2017-12-19 PROCEDURE — 85610 PROTHROMBIN TIME: CPT

## 2017-12-19 PROCEDURE — 93005 ELECTROCARDIOGRAM TRACING: CPT

## 2017-12-19 PROCEDURE — 96375 TX/PRO/DX INJ NEW DRUG ADDON: CPT | Mod: XU

## 2017-12-19 PROCEDURE — 84100 ASSAY OF PHOSPHORUS: CPT

## 2017-12-19 PROCEDURE — 82962 GLUCOSE BLOOD TEST: CPT

## 2017-12-19 PROCEDURE — 80048 BASIC METABOLIC PNL TOTAL CA: CPT

## 2017-12-19 PROCEDURE — 31500 INSERT EMERGENCY AIRWAY: CPT

## 2021-08-26 NOTE — DIETITIAN INITIAL EVALUATION ADULT. - PROBLEM SELECTOR PROBLEM 1
Medicare Wellness Visit patient outreach outcome:  Unable to make appointment: Unable to reach patient      Sugar Andrade  Population Health Assistant   PH. [phone]    
Status epilepticus

## 2022-09-12 NOTE — PROGRESS NOTE ADULT - PROBLEM/PLAN-5
----- Message from Joseph Nieves NP sent at 9/12/2022  7:58 AM CDT -----  Labs completed due to atrial fibrillation. TSH is normal at 0.633. renal function and electrolytes relatively stable. No changes recommended based on labs.  
DISPLAY PLAN FREE TEXT